# Patient Record
Sex: FEMALE | Employment: FULL TIME | URBAN - METROPOLITAN AREA
[De-identification: names, ages, dates, MRNs, and addresses within clinical notes are randomized per-mention and may not be internally consistent; named-entity substitution may affect disease eponyms.]

---

## 2020-02-25 ENCOUNTER — OFFICE VISIT (OUTPATIENT)
Dept: FAMILY MEDICINE | Facility: CLINIC | Age: 50
End: 2020-02-25
Payer: COMMERCIAL

## 2020-02-25 VITALS
SYSTOLIC BLOOD PRESSURE: 110 MMHG | OXYGEN SATURATION: 98 % | RESPIRATION RATE: 15 BRPM | HEART RATE: 81 BPM | DIASTOLIC BLOOD PRESSURE: 78 MMHG | TEMPERATURE: 98 F | BODY MASS INDEX: 29.22 KG/M2 | HEIGHT: 67 IN | WEIGHT: 186.2 LBS

## 2020-02-25 DIAGNOSIS — J01.01 ACUTE RECURRENT MAXILLARY SINUSITIS: Primary | ICD-10-CM

## 2020-02-25 PROCEDURE — 99213 OFFICE O/P EST LOW 20 MIN: CPT | Performed by: FAMILY MEDICINE

## 2020-02-25 RX ORDER — CHOLECALCIFEROL (VITAMIN D3) 50 MCG
1 TABLET ORAL DAILY
COMMUNITY

## 2020-02-25 RX ORDER — MOMETASONE FUROATE MONOHYDRATE 50 UG/1
2 SPRAY, METERED NASAL
COMMUNITY

## 2020-02-25 RX ORDER — LORATADINE 10 MG/1
TABLET ORAL
COMMUNITY

## 2020-02-25 RX ORDER — AZITHROMYCIN 250 MG/1
TABLET, FILM COATED ORAL
Qty: 6 TABLET | Refills: 0 | Status: SHIPPED | OUTPATIENT
Start: 2020-02-25 | End: 2020-03-01

## 2020-02-25 RX ORDER — VITAMIN B COMPLEX
1 TABLET ORAL
COMMUNITY
Start: 2013-09-12

## 2020-02-25 RX ORDER — SERTRALINE HYDROCHLORIDE 25 MG/1
TABLET, FILM COATED ORAL
COMMUNITY
Start: 2020-01-07 | End: 2020-02-25

## 2020-02-25 ASSESSMENT — MIFFLIN-ST. JEOR: SCORE: 1483.61

## 2020-02-25 NOTE — PROGRESS NOTES
"Subjective     Nia Wilder is a 50 year old female who presents to clinic today for the following health issues:    HPI   ENT Symptoms/New patient             Symptoms: cc Present Absent Comment   Fever/Chills   x    Fatigue  x     Muscle Aches  x     Eye Irritation   x    Sneezing   x    Nasal Ilan/Drg x x  Congestion   Sinus Pressure/Pain x x     Loss of smell   x    Dental pain  x  Pain in upper teeth left side   Sore Throat   x    Swollen Glands   x    Ear Pain/Fullness   x    Cough  x     Wheeze   x    Chest Pain   x    Shortness of breath   x    Rash   x    Other  x  Nausea, dizziness     Symptom duration:  3 weeks ago, progressively getting worse   Symptom severity:  moderate   Treatments tried:  Flonase, nyquil, tylenol and ibuprofen. Has taken tylenol and ibuprofen, lst dose taken 7 am   Contacts:  None     Has had sinusitis several times in the past but not for over a year.  Tends to respond well to azithromycin though has needed two courses of therapy on occasion.  She has a history of ? anaphylaxis to penicillin in past.              Reviewed and updated as needed this visit by Provider         Review of Systems   ROS COMP: Constitutional, HEENT, cardiovascular, pulmonary, gi and gu systems are negative, except as otherwise noted.      Objective    /78   Pulse 81   Temp 98  F (36.7  C) (Tympanic)   Resp 15   Ht 1.696 m (5' 6.77\")   Wt 84.5 kg (186 lb 3.2 oz)   SpO2 98%   BMI 29.36 kg/m    Body mass index is 29.36 kg/m .  Physical Exam   GENERAL: healthy, alert and no distress  EYES: Eyes grossly normal to inspection, PERRL and conjunctivae and sclerae normal  HENT: normal cephalic/atraumatic, ear canals and TM's normal, nose and mouth without ulcers or lesions, nasal mucosa edematous , rhinorrhea yellow, green and thick, oropharynx clear, oral mucous membranes moist and sinuses: maxillary tenderness on left  NECK: no adenopathy, no asymmetry, masses, or scars and thyroid normal to " palpation  RESP: lungs clear to auscultation - no rales, rhonchi or wheezes  CV: regular rate and rhythm, normal S1 S2, no S3 or S4, no murmur, click or rub, no peripheral edema and peripheral pulses strong  ABDOMEN: soft, nontender, no hepatosplenomegaly, no masses and bowel sounds normal  MS: no gross musculoskeletal defects noted, no edema    Diagnostic Test Results:  Labs reviewed in Epic        Assessment & Plan     1. Acute recurrent maxillary sinusitis  Given significant PCN allergy and good response to azithromycin in past, will treat with zpak.  She will call back in a week with an update.  If further antibiotic indicated, would consider switching to doxycycline.  - azithromycin (ZITHROMAX) 250 MG tablet; Take 2 tablets (500 mg) by mouth daily for 1 day, THEN 1 tablet (250 mg) daily for 4 days.  Dispense: 6 tablet; Refill: 0       See Patient Instructions        Cadence Gonzalez MD  [facility]